# Patient Record
Sex: FEMALE | Race: WHITE | NOT HISPANIC OR LATINO | Employment: FULL TIME | URBAN - METROPOLITAN AREA
[De-identification: names, ages, dates, MRNs, and addresses within clinical notes are randomized per-mention and may not be internally consistent; named-entity substitution may affect disease eponyms.]

---

## 2020-03-24 ENCOUNTER — NURSE TRIAGE (OUTPATIENT)
Dept: ADMINISTRATIVE | Facility: CLINIC | Age: 25
End: 2020-03-24
Payer: OTHER GOVERNMENT

## 2020-03-24 ENCOUNTER — CLINICAL SUPPORT (OUTPATIENT)
Dept: URGENT CARE | Facility: CLINIC | Age: 25
End: 2020-03-24
Payer: OTHER GOVERNMENT

## 2020-03-24 DIAGNOSIS — R50.9 FEVER, UNSPECIFIED: Primary | ICD-10-CM

## 2020-03-24 PROCEDURE — U0002 COVID-19 LAB TEST NON-CDC: HCPCS

## 2020-03-24 NOTE — TELEPHONE ENCOUNTER
Pt has history of asthma. Has inhalers and nebulizer on hand. States started with cough and fever 5 days ago. Treating self with Tylenol . Has loss of apetite. Instructed to call telemedicine for advise on testing. Unsure of exposure.    Reason for Disposition   Mild body aches, chills, diarrhea, headache, runny nose, or sore throat occur within 14 days of COVID-19 EXPOSURE    Additional Information   Negative: Severe difficulty breathing (e.g., struggling for each breath, speak in single words, bluish lips)   Negative: Sounds like a life-threatening emergency to the triager   Negative: Difficulty breathing (shortness of breath) occurs and onset > 14 days after COVID-19 EXPOSURE (Close Contact)   Negative: Cough occurs and onset > 14 days after COVID-19 EXPOSURE   Negative: Common cold symptoms and onset > 14 days after COVID-19 EXPOSURE   Negative: Difficulty breathing occurs within 14 days of COVID-19 EXPOSURE   Negative: Patient sounds very sick or weak to the triager   Negative: Fever or feeling feverish within 14 Days of COVID-19 EXPOSURE   Negative: Cough occurs within 14 days of COVID-19 EXPOSURE    Protocols used: CORONAVIRUS (COVID-19) EXPOSURE-A-[place]

## 2020-03-27 ENCOUNTER — TELEPHONE (OUTPATIENT)
Dept: URGENT CARE | Facility: CLINIC | Age: 25
End: 2020-03-27

## 2020-03-27 LAB — SARS-COV-2 RNA RESP QL NAA+PROBE: NOT DETECTED

## 2020-03-27 NOTE — TELEPHONE ENCOUNTER
called with negative test results.  States that she is feeling a little bit better.  Advised to keep up good handwashing practices.